# Patient Record
(demographics unavailable — no encounter records)

---

## 2024-11-16 NOTE — PHYSICAL EXAM
[Appropriately responsive] : appropriately responsive [Alert] : alert [No Acute Distress] : no acute distress [Soft] : soft [Non-tender] : non-tender [Non-distended] : non-distended [No HSM] : No HSM [No Lesions] : no lesions [No Mass] : no mass [Oriented x3] : oriented x3 [Examination Of The Breasts] : a normal appearance [No Discharge] : no discharge [Breast Mass Right Breast ___cm] : no was mass palpable [Labia Majora] : normal [Labia Minora] : normal [Normal] : normal [Tenderness] : nontender [Enlarged ___ wks] : not enlarged [Mass ___ cm] : no uterine mass was palpated [Uterine Adnexae] : non-palpable [FreeTextEntry5] : pap not done

## 2024-11-16 NOTE — HISTORY OF PRESENT ILLNESS
[No] : Patient does not have concerns regarding sex [Currently Active] : currently active [Men] : men [Vaginal] : vaginal [TextBox_4] : On ibandronate for osteoporosis, unsure who prescribes it. Can been on x 2 yrs. She has been on forteo in the past. Still on bioidentical hormones rx'd by naturopath. SHe feels well on them and would like to remain on them. She is aware of risks associated. No vb  [PapSmeardate] : 2023 [Mammogramdate] : 1/2023 [TextBox_31] : nml hpv- [BoneDensityDate] : 7/2021 [TextBox_37] : osteoporosis [ColonoscopyDate] : 9/2024 [TextBox_43] : fu 5 yrs

## 2024-12-09 NOTE — ASSESSMENT
[FreeTextEntry1] : DILEEP CARMONA is a 67 year old female here for a physical exam.  She is also here to follow up on medical issues as noted above.  Dileep has Hashimoto s thyroiditis and thyroid nodules, osteoporosis/osteopenia, chronic back/leg pain/spinal stenosis, OA, chronic tinnitus, and h/o Hep C (considered cured).   She still is due for ENT f/u (Dr. Mayo) re her large thyroid nodules (3.6 cm R, 4 cm L). Stressed the importance of f/u with ENT. Revd incr risk for malignant transformation in thyroid nodules 4+ cm in size. Due again for a repeat thyroid US and to avoid delay I will order this for her but stressed that she must f/u with Dr. Mayo's team in near future.   She also has elevated PTH level with bone loss and I again recommended she see endo for further eval to determine if has hyperparathyroidism. Check labs today as she has not yet seen Endo.   Due for thyroid US (f/u nodules) and RUQ/liver US (h/o Hep C infection) and she will sched in near future

## 2024-12-09 NOTE — HEALTH RISK ASSESSMENT
[No falls in past year] : Patient reported no falls in the past year [0] : 1) Little interest or pleasure doing things: Not at all (0) [PHQ-2 Negative - No further assessment needed] : PHQ-2 Negative - No further assessment needed [Never] : Never [[id number]] : 0

## 2024-12-09 NOTE — PLAN
[FreeTextEntry1] : Continue all medications as prescribed. Check labs as above. Will adjust any medications based upon lab results.  Reviewed age-appropriate preventive screening tests with patient. UTD on gyn exam, DEXA and colonoscopy screening. Due for mammogram (Rx in place from gyn team) and she will sched for near future.   Reviewed/recommended annual flu vaccine, PCV 20 and Shingrix series (and if had 2 doses of Shingrix needs to get dates for me).   She defers on ECG today. Has no cardiac sxs or concerns.  Discussed clean eating (eg Mediterranean style plant focused whole food eating plan) and regular exercise/staying as physically active as possible. Include balance exercises and strength training and core strengthening exercises for bone health and to decrease risk for falls.  Recommended calcium 5958-6333 mg daily ideally mainly or fully from food sources +/- supplement if needed, vit D 5472-1918 IU or whatever dose is needed to get D into 30-80 range. Recommended regular weight bearing exercise as well as strength training exercise 3 or more times per week and balance exercises regularly.  Reviewed Mediterranean style eating plan, regular physical exercise, stay well hydrated, do regular brain/cognitive exercise/challenges (eg puzzles of any type etc), avoid alcohol, mindfulness/meditation, vit D, eat omega three rich foods daily +/- use omega three supplement, keep engaged socially, stress reduction measures etc to help promote healthy brain aging.  Reviewed importance of good self care (e.g. meditation, yoga, adequate rest, regular exercise, magnesium, clean eating, etc.).  Follow up with specialists as recommended by them.   Follow up for next physical in one year. Schedule RPA visit (thyroid, PTH, pain med f/u etc) in about 6 months and will rpt labs then.

## 2024-12-09 NOTE — HISTORY OF PRESENT ILLNESS
[FreeTextEntry1] : DILEEP CARMONA is a 67 year old female here for a physical exam. [de-identified] : Her last physical exam was in 5/2023  Vaccines: Tetanus is up to date, Tdap 9/21/2022 Pneumococcal vaccination is NOT up to date Shingrix is NOT up to date, she states had 1 dose Shingrix at [facility] in past year or so she thinks and she will get second dose in near future and let me know dates  Her last dentist visit was within past 6-12 months Her last eye doctor appointment was less than one year ago Her last dermatologist visit was less than one year ago, Dr. Castrejon  GYN visit is up to date, 11/2024 Dr. Moore Mammogram is NOT up to date, 1/2023 Colon cancer screening is up to date; colonoscopy 9/2024 wnl, rpt due at 5 yrs (9/2029) due to h/o polyps, Dr. Rodriguez  DEXA is up to date, 12/2023, osteoporosis improved to osteopenia -2.3 fem neck lowest T score; on Ibandronate Rx   Her diet is healthy overall Exercise:   Crystal has Hashimoto s thyroiditis and thyroid nodules, osteoporosis/osteopenia, chronic back/leg pain/spinal stenosis, OA, and h/o Hep C (considered cured).    F/u thyroid nodules-- She usually sees specialist in [facility] annually and he titrates her natural thyroid regimen. She has multiple nodules at least one of which (3.5 cm R sided nodule) requires periodic thyroid biopsy. She saw Dr. Mayo and had thyroid biopsy repeated (due to incr size of nodule on imaging) 5/2022 and results were benign.  She sees [facility] specialist re natural compounded thyroid med and would like TFTs checked today with labs  Sees Dr. Goldberg (rheum, [facility]) for her osteoporosis. Started on Ibandronate in 2023 and is tolerating well   She takes Tramadol 25 mg 1/3 of a pill occas for severe pain. Limits use as much as she can and Rx for #60 pills lasts 4-6 mos. Never takes more than 1/3 to 1/2 pill and med is well tolerated.   Taking gabapentin 100 mg QHS though can take up to TID if needed. Main reason she is taking this is for her chronic pain and tinnitus and it does work. Is using Tramadol more days. Last RF 7/2024 for #60 and has 2 pills left. Would like RF  Consulted with a different ortho/spine HSS Dr. Calzada in 2023. Had MRI of C/T/L/S spine. She has multilevel OA and post surgical changes. He told her no more surgical options exist for her at this point and recommended medical mgmt.. She has not seen pain mgmt. and we are discussing this today. She will look into seeing a pain mgmt. 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 Had XR from 10/2022 showing migration of screw. Is managing to live with this for now but pain is daily and significant and worsening as she gets older . Saw specialist in [facility] a few yrs ago and was advised no surgical options.   A 12/2023 thyroid US showed enlgd right thyroid lobe and multiple B nodules incl two large nodules (3.6 cm right lobe, 4 cm left lobe). Results faxed to Dr. Mayo and recommended she sched f/u visit with him to review thyroid US and whether she needs to consider repeat thyroid biopsy at this time. She still did not get to see him. We revd today again and she says will schedule, and since is due for updated thyroid US she will do that prior to ENT visit   1/5/24 PTH elevated 81, vit D 36. Rec incr vit D by addtl 1000 IU daily. Recommended she see endo (she has Endo in [facility] who manages her thyroid, or can see endo locally) for eval for poss hyperparathyroidism and if has that should see surg for parathyroidectomy (given her osteopenia/porosis, h/o kidney stones etc); if can not get to endo for >6 mos needs RPA visit here in 3-4 to recheck labs. She did not yet see Endo nor did she RTO for rpt labs so will do these today.   Her thyroid compounded med is liothyronine 25 mcg with 80 mcg levothyroxine   Has OA B hands R >>L. R hand dominant. Uses heat, ice. We are discussing can see ortho/hand if/when feels need to and in the meantime she will use supp care measures for OA.   Brandan is 2 (her grandson)!

## 2024-12-09 NOTE — PLAN
[FreeTextEntry1] : Continue all medications as prescribed. Check labs as above. Will adjust any medications based upon lab results.  Reviewed age-appropriate preventive screening tests with patient. UTD on gyn exam, DEXA and colonoscopy screening. Due for mammogram (Rx in place from gyn team) and she will sched for near future.   Reviewed/recommended annual flu vaccine, PCV 20 and Shingrix series (and if had 2 doses of Shingrix needs to get dates for me).   She defers on ECG today. Has no cardiac sxs or concerns.  Discussed clean eating (eg Mediterranean style plant focused whole food eating plan) and regular exercise/staying as physically active as possible. Include balance exercises and strength training and core strengthening exercises for bone health and to decrease risk for falls.  Recommended calcium 6257-0669 mg daily ideally mainly or fully from food sources +/- supplement if needed, vit D 4786-3484 IU or whatever dose is needed to get D into 30-80 range. Recommended regular weight bearing exercise as well as strength training exercise 3 or more times per week and balance exercises regularly.  Reviewed Mediterranean style eating plan, regular physical exercise, stay well hydrated, do regular brain/cognitive exercise/challenges (eg puzzles of any type etc), avoid alcohol, mindfulness/meditation, vit D, eat omega three rich foods daily +/- use omega three supplement, keep engaged socially, stress reduction measures etc to help promote healthy brain aging.  Reviewed importance of good self care (e.g. meditation, yoga, adequate rest, regular exercise, magnesium, clean eating, etc.).  Follow up with specialists as recommended by them.   Follow up for next physical in one year. Schedule RPA visit (thyroid, PTH, pain med f/u etc) in about 6 months and will rpt labs then.

## 2024-12-09 NOTE — PHYSICAL EXAM
[No Acute Distress] : no acute distress [Well Nourished] : well nourished [Well Developed] : well developed [Well-Appearing] : well-appearing [Normal Sclera/Conjunctiva] : normal sclera/conjunctiva [EOMI] : extraocular movements intact [Normal Outer Ear/Nose] : the outer ears and nose were normal in appearance [Normal Oropharynx] : the oropharynx was normal [No JVD] : no jugular venous distention [No Lymphadenopathy] : no lymphadenopathy [Supple] : supple [Thyroid Normal, No Nodules] : the thyroid was normal and there were no nodules present [No Respiratory Distress] : no respiratory distress  [No Accessory Muscle Use] : no accessory muscle use [Clear to Auscultation] : lungs were clear to auscultation bilaterally [Normal Rate] : normal rate  [Regular Rhythm] : with a regular rhythm [Normal S1, S2] : normal S1 and S2 [No Murmur] : no murmur heard [No Carotid Bruits] : no carotid bruits [No Varicosities] : no varicosities [Pedal Pulses Present] : the pedal pulses are present [No Edema] : there was no peripheral edema [No Extremity Clubbing/Cyanosis] : no extremity clubbing/cyanosis [Soft] : abdomen soft [Non Tender] : non-tender [Non-distended] : non-distended [No Masses] : no abdominal mass palpated [No HSM] : no HSM [Normal Bowel Sounds] : normal bowel sounds [Normal Posterior Cervical Nodes] : no posterior cervical lymphadenopathy [Normal Anterior Cervical Nodes] : no anterior cervical lymphadenopathy [No Joint Swelling] : no joint swelling [Grossly Normal Strength/Tone] : grossly normal strength/tone [No Rash] : no rash [Coordination Grossly Intact] : coordination grossly intact [No Focal Deficits] : no focal deficits [Normal Gait] : normal gait [Normal Affect] : the affect was normal [Normal Insight/Judgement] : insight and judgment were intact [de-identified] : slender frame, able to get on and off exam table and squat and stand and change positions independently  [de-identified] : +diffuse thyroid gland enlargement R>L, R sided nodule 3-4+ cm in size [de-identified] : mild decr ROM flexion R wrist with mild bony prominence dorsal wrist/dorsal proximal hand NT

## 2024-12-09 NOTE — HISTORY OF PRESENT ILLNESS
[FreeTextEntry1] : DILEEP CARMONA is a 67 year old female here for a physical exam. [de-identified] : Her last physical exam was in 5/2023  Vaccines: Tetanus is up to date, Tdap 9/21/2022 Pneumococcal vaccination is NOT up to date Shingrix is NOT up to date, she states had 1 dose Shingrix at [facility] in past year or so she thinks and she will get second dose in near future and let me know dates  Her last dentist visit was within past 6-12 months Her last eye doctor appointment was less than one year ago Her last dermatologist visit was less than one year ago, Dr. Castrejon  GYN visit is up to date, 11/2024 Dr. Moore Mammogram is NOT up to date, 1/2023 Colon cancer screening is up to date; colonoscopy 9/2024 wnl, rpt due at 5 yrs (9/2029) due to h/o polyps, Dr. Rodriguez  DEXA is up to date, 12/2023, osteoporosis improved to osteopenia -2.3 fem neck lowest T score; on Ibandronate Rx   Her diet is healthy overall Exercise:   Crystal has Hashimoto s thyroiditis and thyroid nodules, osteoporosis/osteopenia, chronic back/leg pain/spinal stenosis, OA, and h/o Hep C (considered cured).    F/u thyroid nodules-- She usually sees specialist in [facility] annually and he titrates her natural thyroid regimen. She has multiple nodules at least one of which (3.5 cm R sided nodule) requires periodic thyroid biopsy. She saw Dr. Mayo and had thyroid biopsy repeated (due to incr size of nodule on imaging) 5/2022 and results were benign.  She sees [facility] specialist re natural compounded thyroid med and would like TFTs checked today with labs  Sees Dr. Goldberg (rheum, [facility]) for her osteoporosis. Started on Ibandronate in 2023 and is tolerating well   She takes Tramadol 25 mg 1/3 of a pill occas for severe pain. Limits use as much as she can and Rx for #60 pills lasts 4-6 mos. Never takes more than 1/3 to 1/2 pill and med is well tolerated.   Taking gabapentin 100 mg QHS though can take up to TID if needed. Main reason she is taking this is for her chronic pain and tinnitus and it does work. Is using Tramadol more days. Last RF 7/2024 for #60 and has 2 pills left. Would like RF  Consulted with a different ortho/spine HSS Dr. Calzada in 2023. Had MRI of C/T/L/S spine. She has multilevel OA and post surgical changes. He told her no more surgical options exist for her at this point and recommended medical mgmt.. She has not seen pain mgmt. and we are discussing this today. She will look into seeing a pain mgmt. 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 Had XR from 10/2022 showing migration of screw. Is managing to live with this for now but pain is daily and significant and worsening as she gets older . Saw specialist in [facility] a few yrs ago and was advised no surgical options.   A 12/2023 thyroid US showed enlgd right thyroid lobe and multiple B nodules incl two large nodules (3.6 cm right lobe, 4 cm left lobe). Results faxed to Dr. Mayo and recommended she sched f/u visit with him to review thyroid US and whether she needs to consider repeat thyroid biopsy at this time. She still did not get to see him. We revd today again and she says will schedule, and since is due for updated thyroid US she will do that prior to ENT visit   1/5/24 PTH elevated 81, vit D 36. Rec incr vit D by addtl 1000 IU daily. Recommended she see endo (she has Endo in [facility] who manages her thyroid, or can see endo locally) for eval for poss hyperparathyroidism and if has that should see surg for parathyroidectomy (given her osteopenia/porosis, h/o kidney stones etc); if can not get to endo for >6 mos needs RPA visit here in 3-4 to recheck labs. She did not yet see Endo nor did she RTO for rpt labs so will do these today.   Her thyroid compounded med is liothyronine 25 mcg with 80 mcg levothyroxine   Has OA B hands R >>L. R hand dominant. Uses heat, ice. We are discussing can see ortho/hand if/when feels need to and in the meantime she will use supp care measures for OA.   Brandan is 2 (her grandson)!

## 2024-12-09 NOTE — HEALTH RISK ASSESSMENT
[No falls in past year] : Patient reported no falls in the past year [0] : 1) Little interest or pleasure doing things: Not at all (0) [PHQ-2 Negative - No further assessment needed] : PHQ-2 Negative - No further assessment needed [Never] : Never [[id number]] : 0

## 2024-12-09 NOTE — REVIEW OF SYSTEMS
[Hearing Loss] : hearing loss [Joint Pain] : joint pain [Joint Stiffness] : joint stiffness [Muscle Pain] : muscle pain [Back Pain] : back pain [Negative] : Heme/Lymph [Sore Throat] : no sore throat [Postnasal Drip] : no postnasal drip [FreeTextEntry4] : +tinnitus, persistent and now assoc with some hearing loss so will be getting hearing aid  [FreeTextEntry9] : see HPI, Fx Radius R in early 2023 healed with some mild dorsal deformity (bony growth) but overall has healed well  [de-identified] : +brain fog more as she gets older  [FreeTextEntry1] : Thyroid nodules stable in past year but overall enlarging over time she notes, No dysphagia or vocal changes related to it

## 2024-12-09 NOTE — PHYSICAL EXAM
[No Acute Distress] : no acute distress [Well Nourished] : well nourished [Well Developed] : well developed [Well-Appearing] : well-appearing [Normal Sclera/Conjunctiva] : normal sclera/conjunctiva [EOMI] : extraocular movements intact [Normal Outer Ear/Nose] : the outer ears and nose were normal in appearance [Normal Oropharynx] : the oropharynx was normal [No JVD] : no jugular venous distention [No Lymphadenopathy] : no lymphadenopathy [Supple] : supple [Thyroid Normal, No Nodules] : the thyroid was normal and there were no nodules present [No Respiratory Distress] : no respiratory distress  [No Accessory Muscle Use] : no accessory muscle use [Clear to Auscultation] : lungs were clear to auscultation bilaterally [Normal Rate] : normal rate  [Regular Rhythm] : with a regular rhythm [Normal S1, S2] : normal S1 and S2 [No Murmur] : no murmur heard [No Carotid Bruits] : no carotid bruits [No Varicosities] : no varicosities [Pedal Pulses Present] : the pedal pulses are present [No Edema] : there was no peripheral edema [No Extremity Clubbing/Cyanosis] : no extremity clubbing/cyanosis [Soft] : abdomen soft [Non Tender] : non-tender [Non-distended] : non-distended [No Masses] : no abdominal mass palpated [No HSM] : no HSM [Normal Bowel Sounds] : normal bowel sounds [Normal Posterior Cervical Nodes] : no posterior cervical lymphadenopathy [Normal Anterior Cervical Nodes] : no anterior cervical lymphadenopathy [No Joint Swelling] : no joint swelling [Grossly Normal Strength/Tone] : grossly normal strength/tone [No Rash] : no rash [Coordination Grossly Intact] : coordination grossly intact [No Focal Deficits] : no focal deficits [Normal Gait] : normal gait [Normal Affect] : the affect was normal [Normal Insight/Judgement] : insight and judgment were intact [de-identified] : slender frame, able to get on and off exam table and squat and stand and change positions independently  [de-identified] : +diffuse thyroid gland enlargement R>L, R sided nodule 3-4+ cm in size [de-identified] : mild decr ROM flexion R wrist with mild bony prominence dorsal wrist/dorsal proximal hand NT

## 2024-12-09 NOTE — REVIEW OF SYSTEMS
[Hearing Loss] : hearing loss [Joint Pain] : joint pain [Joint Stiffness] : joint stiffness [Muscle Pain] : muscle pain [Back Pain] : back pain [Negative] : Heme/Lymph [Sore Throat] : no sore throat [Postnasal Drip] : no postnasal drip [FreeTextEntry4] : +tinnitus, persistent and now assoc with some hearing loss so will be getting hearing aid  [FreeTextEntry9] : see HPI, Fx Radius R in early 2023 healed with some mild dorsal deformity (bony growth) but overall has healed well  [de-identified] : +brain fog more as she gets older  [FreeTextEntry1] : Thyroid nodules stable in past year but overall enlarging over time she notes, No dysphagia or vocal changes related to it

## 2025-04-16 NOTE — REVIEW OF SYSTEMS
[Hearing Loss] : hearing loss [Joint Pain] : joint pain [Joint Stiffness] : joint stiffness [Muscle Pain] : muscle pain [Back Pain] : back pain [Negative] : Heme/Lymph [Sore Throat] : no sore throat [Postnasal Drip] : no postnasal drip [FreeTextEntry4] : +tinnitus, persistent and now assoc with some hearing loss; has hearing aids though has not improved her tinnitus. Tinnitus worsening over past few yrs   [FreeTextEntry9] : OA related aches and pains and stiffness, she manages  [de-identified] : +brain fog more as she gets older  [FreeTextEntry1] : Thyroid nodules stable in past year but overall enlarging over time she notes, No dysphagia or vocal changes related to it

## 2025-04-16 NOTE — PHYSICAL EXAM
[No Acute Distress] : no acute distress [Well Nourished] : well nourished [Well Developed] : well developed [Well-Appearing] : well-appearing [Normal Sclera/Conjunctiva] : normal sclera/conjunctiva [EOMI] : extraocular movements intact [Normal Outer Ear/Nose] : the outer ears and nose were normal in appearance [Normal Oropharynx] : the oropharynx was normal [No JVD] : no jugular venous distention [No Lymphadenopathy] : no lymphadenopathy [Supple] : supple [Thyroid Normal, No Nodules] : the thyroid was normal and there were no nodules present [No Respiratory Distress] : no respiratory distress  [No Accessory Muscle Use] : no accessory muscle use [Clear to Auscultation] : lungs were clear to auscultation bilaterally [Normal Rate] : normal rate  [Regular Rhythm] : with a regular rhythm [Normal S1, S2] : normal S1 and S2 [No Murmur] : no murmur heard [No Carotid Bruits] : no carotid bruits [No Varicosities] : no varicosities [Pedal Pulses Present] : the pedal pulses are present [No Edema] : there was no peripheral edema [No Extremity Clubbing/Cyanosis] : no extremity clubbing/cyanosis [Soft] : abdomen soft [Non Tender] : non-tender [Non-distended] : non-distended [No Masses] : no abdominal mass palpated [No HSM] : no HSM [Normal Bowel Sounds] : normal bowel sounds [Normal Posterior Cervical Nodes] : no posterior cervical lymphadenopathy [Normal Anterior Cervical Nodes] : no anterior cervical lymphadenopathy [No Joint Swelling] : no joint swelling [Grossly Normal Strength/Tone] : grossly normal strength/tone [No Rash] : no rash [Coordination Grossly Intact] : coordination grossly intact [No Focal Deficits] : no focal deficits [Normal Gait] : normal gait [Normal Affect] : the affect was normal [Normal Insight/Judgement] : insight and judgment were intact [de-identified] : slender frame [de-identified] : +diffuse thyroid gland enlargement R>L, R sided nodule 3-4+ cm in size, stable exam from prior

## 2025-04-16 NOTE — PLAN
[FreeTextEntry1] : Continue all medications as prescribed. Check labs as above. Will adjust any medications based upon lab results.  Reminded her she is due for mammo/US (orders in from Dr. Moore) and she says will schedule soon along with thyroid US and liver US  Reviewed/recommended annual flu vaccine, PCV 20 and Shingrix series (and if had 2 doses of Shingrix needs to get dates for me).   Discussed clean eating (eg Mediterranean style plant focused whole food eating plan) and regular exercise/staying as physically active as possible. Include balance exercises and strength training and core strengthening exercises for bone health and to decrease risk for falls.  Recommended calcium 7952-2325 mg daily ideally mainly or fully from food sources +/- supplement if needed, vit D 4088-3634 IU or whatever dose is needed to get D into 30-80 range. Recommended regular weight bearing exercise as well as strength training exercise 3 or more times per week and balance exercises regularly.  Reviewed Mediterranean style eating plan, regular physical exercise, stay well hydrated, do regular brain/cognitive exercise/challenges (eg puzzles of any type etc), avoid alcohol, mindfulness/meditation, vit D, eat omega three rich foods daily +/- use omega three supplement, keep engaged socially, stress reduction measures etc to help promote healthy brain aging.  Reviewed importance of good self care (e.g. meditation, yoga, adequate rest, regular exercise, magnesium, clean eating, etc.).  Follow up with specialists as recommended by them.   Follow up for CPE and RPA visit (thyroid, PTH, pain med f/u etc) in about 6 months and will rpt labs then.  Time spent immediately before and after, as well as Face-to-face time spent during visit with patient, over half in discussion of the above diagnoses and treatment plan: 30 minutes.

## 2025-04-16 NOTE — ASSESSMENT
[FreeTextEntry1] : DILEEP CARMONA is a 67 year old female here for follow up on medical issues as noted above.  Dileep has Hashimoto s thyroiditis and thyroid nodules, osteoporosis/osteopenia, chronic back/leg pain/spinal stenosis, OA, chronic tinnitus, and h/o Hep C (considered cured).   She had ENT f/u (Dr. Mayo) re her large thyroid nodules (3.6 cm R, 4 cm L) 4/2025 and notes he advised unlikely that she needs biopsy though he recommended she get f/u thyroid US. She did not get Rx from him so I placed order for thyroid US, cc Dr. Mayo  At visit 12/2024 labs showed TSH again low and FT3 high, FT4 wnl. Urged her to d/w her naturopath in [facility] to get the liothyronine component of her compounded thyroid med d/c'ed or decreased and rpt TFTs in 4-6 wks. She did not yet do this (see below). She notes that she feels thyroid med over-replaced in terms of jitteriness/edgy/hyper feeling. I also wonder if thyroid med over-replacement might be exacerbating her tinnitus   She notes that thyroid med naturopath specialist has moved out [place] and is too difficult and costly to cont to see him. Wants me to take over thyroid meds and we revd I am am happy though cannot do compounded meds. She is willing to transition to regular levothyroxine (+/- liothyronine though not clear that she needs that and we revd this T3 med is much less freq used now than in the past, and as her labs shows T3 levels too high I am not convinces that she needs this. Will check TFts today and if similar to prior we disc plan to stop liothyronine and cont levothyroxine )curr 80 mcg in her compounded med) at similar dose (75 mcg for now as nearest dose and revd better to under replace than overreplace in short term to minimize risk for arrhythmia etc ). Will track TFts and adjust her meds.   AT her 12/2024 visit RUQ/liver US (f/u Hep C) was ordered though not yet completed. Ordered again and she will schedule    Will be seeing pain mgmt at [facility] re her chronic back pain.

## 2025-04-16 NOTE — HISTORY OF PRESENT ILLNESS
[FreeTextEntry1] : DILEEP CARMONA is a 67 year old female here for a follow up visit. [de-identified] : Crystal has Hashimoto s thyroiditis and thyroid nodules, osteoporosis/osteopenia, chronic back/leg pain/spinal stenosis, OA, and h/o Hep C (considered cured).   F/u thyroid nodules-- She usually sees specialist in [facility] annually and he titrates her natural thyroid regimen. She has multiple nodules at least one of which (3.5 cm R sided nodule) requires periodic thyroid biopsy. She saw Dr. Mayo and had thyroid biopsy repeated (due to incr size of nodule on imaging) 5/2022 and results were benign.  She sees [facility] specialist re natural compounded thyroid med and would like TFTs checked today with labs  Sees Dr. Goldberg ([facility]) for her osteoporosis. Started on Ibandronate in 2023 and is tolerating well   She takes Tramadol 25 mg 1/3 of a pill occas for severe pain. Limits use as much as she can and Rx for #60 pills lasts 4-6 mos. Never takes more than 1/3 to 1/2 pill and med is well tolerated.   Taking gabapentin 100 mg QHS though can take up to TID if needed. Main reason she is taking this is for her chronic pain and tinnitus and it does work. Is using Tramadol more days. Last RF 7/2024 for #60 and has 2 pills left. Would like RF  Consulted with a different ortho/spine [facility] Dr. Calzada in 2023. Had MRI of C/T/L/S spine. She has multilevel OA and post surgical changes. He told her no more surgical options exist for her at this point and recommended medical mgmt.. She has not seen pain mgmt. and we are discussing this today. She will look into seeing a pain mgmt. 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 Had XR from 10/2022 showing migration of screw. Is managing to live with this for now but pain is daily and significant and worsening as she gets older . Saw specialist in [facility] a few yrs ago and was advised no surgical options.   A 12/2023 thyroid US showed enlgd right thyroid lobe and multiple B nodules incl two large nodules (3.6 cm right lobe, 4 cm left lobe). Results faxed to Dr. Mayo and recommended she sched f/u visit with him to review thyroid US and whether she needs to consider repeat thyroid biopsy at this time. She did not get to see him in 2024. We revd at her 12/2024 visit again re need for updated thyroid US and ENT follow up visit .   Her thyroid compounded med is liothyronine 25 mcg with 80 mcg levothyroxine. Labs past few checks have shown med over-replacement and 12/2024 labs showed low TSH, high FT3, nl FT4. I recommended she d/w her naturopath in [facility] re decreasing her compounded med. She notes that she had been seeing him/consulting with him Q3mo though he has moved from [facility] to [place] and so getting harder to see him and also very costly. She would like me to take over RF thyroid meds.   Saw Dr. Mayo this week. Had not done thyroid US prior to that visit as did not realize order was in. He told her he no longer does thyroid biopsies though he thinks less likely that she needs a biopsy at this time. He recommended she have thyroid US.  Has OA B hands R >>L. R hand dominant. Uses heat, ice. We are discussing can see ortho/hand if/when feels need to and in the meantime she will use supp care measures for OA.   Tinnitus is driving her crazy as is loud and constant and worsening over the years

## 2025-04-16 NOTE — HEALTH RISK ASSESSMENT
[No falls in past year] : Patient reported no falls in the past year [0] : 2) Feeling down, depressed, or hopeless: Not at all (0) [PHQ-2 Negative - No further assessment needed] : PHQ-2 Negative - No further assessment needed [Never] : Never [[id number]] : 0

## 2025-06-13 NOTE — HISTORY OF PRESENT ILLNESS
[FreeTextEntry1] : DILEEP CARMONA is a 68 year old female here for a follow up visit.  [de-identified] : Crystal has Hashimoto s thyroiditis and thyroid nodules, osteoporosis/osteopenia (On Ibandronate Rx since 2023 with rheum), chronic back/leg pain/spinal stenosis, OA, and h/o Hep C (considered cured).  Sees Dr. Goldberg ([facility]) for her osteoporosis. Has seen Dr. Mayo ([facility]) periodically re thyroid nodule over prior years though needs to change ENT at this time as was advised by Dr. Mayo/team that he is no longer doing thyroid nodule/biopsy follow up.   She takes Tramadol 25 mg 1/3 of a pill occas for severe pain. Limits use as much as she can and Rx for #60 pills lasts 4-6 mos. Never takes more than 1/3 to 1/2 pill and med is well tolerated. Requesting RF today, last RF was in early 12/22024   Taking gabapentin 100 mg QHS though can take up to TID if needed. Main reason she is taking this is for her chronic pain and tinnitus and it does work. Is using Tramadol more days.  Consulted with a different ortho/spine [facility] Dr. Calzada in 2023. Had MRI of C/T/L/S spine. She has multilevel OA and post surgical changes. He told her no more surgical options exist for her at this point and recommended medical mgmt.. She has not seen pain mgmt. and we are discussing this today. She will look into seeing a pain mgmt. specialist at [facility]  Screw in cervical spine has moved and is causing pain. Had XR from 10/2022 showing migration of screw. Is managing to live with this for now but pain is daily and significant and worsening as she gets older. Saw specialist in [facility] a few yrs ago and was advised no surgical options. We are reviewing again today option to see pain mgmt for consultation and she feels is not needed at this time as pain is at a stable/manageable level with current regimen.

## 2025-06-13 NOTE — HEALTH RISK ASSESSMENT
[No falls in past year] : Patient reported no falls in the past year [0] : 2) Feeling down, depressed, or hopeless: Not at all (0) [PHQ-2 Negative - No further assessment needed] : PHQ-2 Negative - No further assessment needed [Never] : Never [[id number]] : 0

## 2025-06-13 NOTE — ASSESSMENT
[FreeTextEntry1] : DILEEP CARMONA is a 68 year old female here for follow up on medical issues as noted above.  Dileep has Hashimoto s thyroiditis and thyroid nodules, osteoporosis/osteopenia (On Ibandronate Rx since 2023 with rheum), chronic back/leg pain/spinal stenosis, OA, and h/o Hep C (considered cured).  Requests RF Tramadol, Uses sparingly. Last RF 12//9/24 #60. Using approx 10 pills per month on average.   Her thyroid compounded med was liothyronine 25 mcg with 80 mcg levothyroxine (though clarifies today for me that this was BID dosed and not just once a day). Labs past few checks have shown med over-replacement and 12/2024 labs showed low TSH, high FT3, nl FT4. I [facility] her compounded med. She requested in Spring 2024 that we take over RF thyroid meds as [facility] too [facility]/time consuming.  At 4/17/25 visit we Stopped compounded thyroid med (25 mcg liothyronine, 80 mcg levothyroxine BID though at the time I thought was once daily dosing) and starting levothyroxine 75 mcg daily as labs d/w thyroid med over-replacement and she was having s/sx thyroid med over-replacement (jittery, incr anxiety, incr tinnitus etc). Requested she rpt labs at [facility] in 4-6 wks for further input re med adjustment.  In early May 2025 we Added liothyronine 5 mcg 2 pills (10 mcg) daily at her request (as she was feeling sluggish/wgt gain since stopping the 25 mcg liothyronine compounded med). Requested she repeat labs in 4-6 wks; will do these today.   She updated us this week that instead of adding liothyronine 10 mcg (did not realize that Rx was sent to [facility]) she restarted 25 mcg liothyronine/Levothyroxine 80 mcg daily along with the 75 mcg of levothyroxine as was feeling completely exhausted taking just the 75 mcg levothyroxine daily. Clarifies for me today that she had been taking the compounded med (80/25mcg) TWICE a day and not just once a day so daily levo dose was closer to 160 mcg.   CURRENT THYROID  MED REGIMEN is Levothyroxine 75 mcg daily plus levothyroxine 80/liothyronine 25 mcg daily. Will check labs today and adjust meds further. Again revd that liothyronine is not recommended for regular use in general , and I recommend she work with us to find the dose of levothyroxine alone that optimizes her thyroid levels without need for the liothyronine.  Revd option to try Levothyroxine 150 mcg daily WITHOUT Liothyronine; she prefers to cont Liothyronine dose for now though agrees can be lower dose.   NEW THYROID PLAN: Check labs today (mainly to have a new baseline to work with for next few sets of labs). START Levothyroxine 150 mcg daily plus Liothyronine 5 mcg daily . Revd need to do f/u labs ([facility] orders in placed) in 4-6 weeks so we can reassess and adjust dose of med if needed. Revd should not adjust meds/doses on her own without first reviewing with us. Also revd/offered Endo referral for assistance with med mgmt; she defers on Endo referral for now though will let me know if ever wishes to pursue.    Offered endo referral for consultation/assistance with med mgmt.; she defers at this time though can let me know if changes her mind   4/27/25 thyroid US shows new 5 mm nodule TiRads 7 requiring f/u imaging and stable 3.6 cm right lower thyroid nodule, stable from 2023, previously biopsied and benign path. Recommended she follow up with Dr. Mayo if any need for repeat biopsy at this time vs not and will d/w him re what he recommends in terms of f/u thyroid US (6 mos vs 12 mos etc).   She notes that she d/w Dr. Mayo and was advised he is not managing thyroid nodules/doing thyroid biopsies anymore so she will need to see a different specialist and should get her prior records from Dr. Mayo's office to bring to new appt. Given Dr. Bao Christine and Dr. Asmita Brtit's information.   5/2025 RUQ/liver US (f/u Hep C) showed normal liver without lesion. Incidentally noted 5 mm right renal lower pole kidney stone without obstruction or hydronephrosis was noted. Revd excellent hydration and limit/avoid calcium in pill form, limit/avoid oxalic acid rich foods, and monitor for sxs and f/u if any occur. Can consider urol consult if desired vs defer unless any sxs occur  Chronic pain. We are reviewing again today option to see pain mgmt for consultation and she feels is not needed at this time as pain is at a stable/manageable level with current regimen. She agrees that if her pain gets signif worse/more life interfering or if need for pain meds incr signif over time she would be willing to see pain mgmt.

## 2025-06-13 NOTE — PLAN
[FreeTextEntry1] : Continue all medications as prescribed EXCEPT START Levothyroxine 150 mcg daily plus Liothyronine 5 mcg daily. Check labs as above. Will adjust any medications based upon lab results.  Reviewed/recommended annual flu vaccine, pneumococcal vaccine (PCV 20 OR PCV 21), and Shingrix series (and if had 2 doses of Shingrix needs to get dates for me).  Discussed clean eating (eg Mediterranean style plant focused whole food eating plan) and regular exercise/staying as physically active as possible. Include balance exercises and strength training and core strengthening exercises for bone health and to decrease risk for falls.  Should try to get calcium needs met fully from food forms and limit/avoid supplement in light of renal stone noted on US 5/2025 Recommended calcium [id number] mg daily ideally mainly or fully from food sources +/- supplement if needed, vit D 4608-0965 IU daily with food that contains fat for better vitamin absorption. Recommended regular weight bearing exercise as well as strength training exercise 2-3+ times per week and balance exercises regularly.  Reviewed Mediterranean style eating plan, regular physical exercise, stay well hydrated, do regular brain/cognitive exercise/challenges (eg puzzles of any type etc), avoid alcohol, mindfulness/meditation, vit D, eat omega three rich foods daily +/- use omega three supplement, keep engaged socially, stress reduction measures etc to help promote healthy brain aging.  Reviewed kidney stones and prevention of same including excellent daily hydration, add lemon to water, limit/avoid oxalate rich foods, avoid calcium supplements (but ok to eat calcium rich foods).  Reviewed importance of good self care (e.g. meditation, yoga, adequate rest, regular exercise, magnesium, clean eating, etc.).  Follow up with specialists as recommended by them.  Follow up for CPE and RPA visit (thyroid, PTH, pain med f/u etc) in about 6 months and will rpt fasting labs then.  Time spent immediately before and after, as well as Face-to-face time spent during visit with patient, over half in discussion of the above diagnoses and treatment plan: 30 minutes.

## 2025-06-13 NOTE — HEALTH RISK ASSESSMENT
[No falls in past year] : Patient reported no falls in the past year [0] : 2) Feeling down, depressed, or hopeless: Not at all (0) [PHQ-2 Negative - No further assessment needed] : PHQ-2 Negative - No further assessment needed [Never] : Never [[id number]] : 0

## 2025-06-13 NOTE — REVIEW OF SYSTEMS
[Hearing Loss] : hearing loss [Joint Pain] : joint pain [Joint Stiffness] : joint stiffness [Muscle Pain] : muscle pain [Back Pain] : back pain [Negative] : Heme/Lymph [Sore Throat] : no sore throat [Postnasal Drip] : no postnasal drip [FreeTextEntry4] : +tinnitus, persistent and now assoc with some hearing loss; has hearing aids though has not improved her tinnitus. Tinnitus worsening over past few yrs   [FreeTextEntry9] : OA related aches and pains and stiffness, she manages  [de-identified] : +brain fog more as she gets older  [FreeTextEntry1] : Thyroid nodules stable in past year but overall enlarging over time she notes, No dysphagia or vocal changes related to it

## 2025-06-13 NOTE — PHYSICAL EXAM
[No Acute Distress] : no acute distress [Well Nourished] : well nourished [Well Developed] : well developed [Well-Appearing] : well-appearing [Normal Sclera/Conjunctiva] : normal sclera/conjunctiva [EOMI] : extraocular movements intact [Normal Outer Ear/Nose] : the outer ears and nose were normal in appearance [Normal Oropharynx] : the oropharynx was normal [No JVD] : no jugular venous distention [No Lymphadenopathy] : no lymphadenopathy [Supple] : supple [Thyroid Normal, No Nodules] : the thyroid was normal and there were no nodules present [No Respiratory Distress] : no respiratory distress  [No Accessory Muscle Use] : no accessory muscle use [Clear to Auscultation] : lungs were clear to auscultation bilaterally [Normal Rate] : normal rate  [Regular Rhythm] : with a regular rhythm [Normal S1, S2] : normal S1 and S2 [No Murmur] : no murmur heard [No Carotid Bruits] : no carotid bruits [No Varicosities] : no varicosities [Pedal Pulses Present] : the pedal pulses are present [No Edema] : there was no peripheral edema [No Extremity Clubbing/Cyanosis] : no extremity clubbing/cyanosis [Soft] : abdomen soft [Non Tender] : non-tender [Non-distended] : non-distended [No Masses] : no abdominal mass palpated [No HSM] : no HSM [Normal Bowel Sounds] : normal bowel sounds [Normal Posterior Cervical Nodes] : no posterior cervical lymphadenopathy [Normal Anterior Cervical Nodes] : no anterior cervical lymphadenopathy [No Joint Swelling] : no joint swelling [Grossly Normal Strength/Tone] : grossly normal strength/tone [No Rash] : no rash [Coordination Grossly Intact] : coordination grossly intact [No Focal Deficits] : no focal deficits [Normal Gait] : normal gait [Normal Affect] : the affect was normal [Normal Insight/Judgement] : insight and judgment were intact [de-identified] : slender frame [de-identified] : +diffuse thyroid gland enlargement R>L, R sided nodule 3-4+ cm in size, stable exam from prior

## 2025-06-13 NOTE — REVIEW OF SYSTEMS
[Hearing Loss] : hearing loss [Joint Pain] : joint pain [Joint Stiffness] : joint stiffness [Muscle Pain] : muscle pain [Back Pain] : back pain [Negative] : Heme/Lymph [Sore Throat] : no sore throat [Postnasal Drip] : no postnasal drip [FreeTextEntry4] : +tinnitus, persistent and now assoc with some hearing loss; has hearing aids though has not improved her tinnitus. Tinnitus worsening over past few yrs   [FreeTextEntry9] : OA related aches and pains and stiffness, she manages  [de-identified] : +brain fog more as she gets older  [FreeTextEntry1] : Thyroid nodules stable in past year but overall enlarging over time she notes, No dysphagia or vocal changes related to it

## 2025-06-13 NOTE — ASSESSMENT
[FreeTextEntry1] : DILEEP CARMONA is a 68 year old female here for follow up on medical issues as noted above.  Dileep has Hashimoto s thyroiditis and thyroid nodules, osteoporosis/osteopenia (On Ibandronate Rx since 2023 with rheum), chronic back/leg pain/spinal stenosis, OA, and h/o Hep C (considered cured).  Requests RF Tramadol, Uses sparingly. Last RF 12//9/24 #60. Using approx 10 pills per month on average.   Her thyroid compounded med was liothyronine 25 mcg with 80 mcg levothyroxine (though clarifies today for me that this was BID dosed and not just once a day). Labs past few checks have shown med over-replacement and 12/2024 labs showed low TSH, high FT3, nl FT4. I recommended she d/w her naturopath in [facility] re decreasing her compounded med. She requested in Spring 2024 that we take over RF thyroid meds as seeing naturopath in [facility] too costly/time consuming.  At 4/17/25 visit we Stopped compounded thyroid med (25 mcg liothyronine, 80 mcg levothyroxine BID though at the time I thought was once daily dosing) and starting levothyroxine 75 mcg daily as labs d/w thyroid med over-replacement and she was having s/sx thyroid med over-replacement (jittery, incr anxiety, incr tinnitus etc). Requested she rpt labs at [facility] in 4-6 wks for further input re med adjustment.  In early May 2025 we Added liothyronine 5 mcg 2 pills (10 mcg) daily at her request (as she was feeling sluggish/wgt gain since stopping the 25 mcg liothyronine compounded med). Requested she repeat labs in 4-6 wks; will do these today.   She updated us this week that instead of adding liothyronine 10 mcg (did not realize that Rx was sent to [facility]) she restarted 25 mcg liothyronine/Levothyroxine 80 mcg daily along with the 75 mcg of levothyroxine as was feeling completely exhausted taking just the 75 mcg levothyroxine daily. Clarifies for me today that she had been taking the compounded med (80/25mcg) TWICE a day and not just once a day so daily levo dose was closer to 160 mcg.   CURRENT THYROID  MED REGIMEN is Levothyroxine 75 mcg daily plus levothyroxine 80/liothyronine 25 mcg daily. Will check labs today and adjust meds further. Again revd that liothyronine is not recommended for regular use in general , and I recommend she work with us to find the dose of levothyroxine alone that optimizes her thyroid levels without need for the liothyronine.  Revd option to try Levothyroxine 150 mcg daily WITHOUT Liothyronine; she prefers to cont Liothyronine dose for now though agrees can be lower dose.   NEW THYROID PLAN: Check labs today (mainly to have a new baseline to work with for next few sets of labs). START Levothyroxine 150 mcg daily plus Liothyronine 5 mcg daily . Revd need to do f/u labs ([facility] orders in placed) in 4-6 weeks so we can reassess and adjust dose of med if needed. Revd should not adjust meds/doses on her own without first reviewing with us. Also revd/offered Endo referral for assistance with med mgmt; she defers on Endo referral for now though will let me know if ever wishes to pursue.    Offered endo referral for consultation/assistance with med mgmt.; she defers at this time though can let me know if changes her mind   4/27/25 thyroid US shows new 5 mm nodule TiRads 7 requiring f/u imaging and stable 3.6 cm right lower thyroid nodule, stable from 2023, previously biopsied and benign path. Recommended she follow up with Dr. Mayo if any need for repeat biopsy at this time vs not and will d/w him re what he recommends in terms of f/u thyroid US (6 mos vs 12 mos etc).   She notes that she d/w Dr. Mayo and was advised he is not managing thyroid nodules/doing thyroid biopsies anymore so she will need to see a different specialist and should get her prior records from Dr. Mayo's office to bring to new appt. Given Dr. Bao Christine and Dr. Asmita Britt's information.   5/2025 RUQ/liver US (f/u Hep C) showed normal liver without lesion. Incidentally noted 5 mm right renal lower pole kidney stone without obstruction or hydronephrosis was noted. Revd excellent hydration and limit/avoid calcium in pill form, limit/avoid oxalic acid rich foods, and monitor for sxs and f/u if any occur. Can consider urol consult if desired vs defer unless any sxs occur  Chronic pain. We are reviewing again today option to see pain mgmt for consultation and she feels is not needed at this time as pain is at a stable/manageable level with current regimen. She agrees that if her pain gets signif worse/more life interfering or if need for pain meds incr signif over time she would be willing to see pain mgmt.

## 2025-06-13 NOTE — PLAN
[FreeTextEntry1] : Continue all medications as prescribed EXCEPT START Levothyroxine 150 mcg daily plus Liothyronine 5 mcg daily. Check labs as above. Will adjust any medications based upon lab results.  Reviewed/recommended annual flu vaccine, pneumococcal vaccine (PCV 20 OR PCV 21), and Shingrix series (and if had 2 doses of Shingrix needs to get dates for me).  Discussed clean eating (eg Mediterranean style plant focused whole food eating plan) and regular exercise/staying as physically active as possible. Include balance exercises and strength training and core strengthening exercises for bone health and to decrease risk for falls.  Should try to get calcium needs met fully from food forms and limit/avoid supplement in light of renal stone noted on US 5/2025 Recommended calcium [id number] mg daily ideally mainly or fully from food sources +/- supplement if needed, vit D 3932-2820 IU daily with food that contains fat for better vitamin absorption. Recommended regular weight bearing exercise as well as strength training exercise 2-3+ times per week and balance exercises regularly.  Reviewed Mediterranean style eating plan, regular physical exercise, stay well hydrated, do regular brain/cognitive exercise/challenges (eg puzzles of any type etc), avoid alcohol, mindfulness/meditation, vit D, eat omega three rich foods daily +/- use omega three supplement, keep engaged socially, stress reduction measures etc to help promote healthy brain aging.  Reviewed kidney stones and prevention of same including excellent daily hydration, add lemon to water, limit/avoid oxalate rich foods, avoid calcium supplements (but ok to eat calcium rich foods).  Reviewed importance of good self care (e.g. meditation, yoga, adequate rest, regular exercise, magnesium, clean eating, etc.).  Follow up with specialists as recommended by them.  Follow up for CPE and RPA visit (thyroid, PTH, pain med f/u etc) in about 6 months and will rpt fasting labs then.  Time spent immediately before and after, as well as Face-to-face time spent during visit with patient, over half in discussion of the above diagnoses and treatment plan: 30 minutes.

## 2025-06-13 NOTE — PHYSICAL EXAM
[No Acute Distress] : no acute distress [Well Nourished] : well nourished [Well Developed] : well developed [Well-Appearing] : well-appearing [Normal Sclera/Conjunctiva] : normal sclera/conjunctiva [EOMI] : extraocular movements intact [Normal Outer Ear/Nose] : the outer ears and nose were normal in appearance [Normal Oropharynx] : the oropharynx was normal [No JVD] : no jugular venous distention [No Lymphadenopathy] : no lymphadenopathy [Supple] : supple [Thyroid Normal, No Nodules] : the thyroid was normal and there were no nodules present [No Respiratory Distress] : no respiratory distress  [No Accessory Muscle Use] : no accessory muscle use [Clear to Auscultation] : lungs were clear to auscultation bilaterally [Normal Rate] : normal rate  [Regular Rhythm] : with a regular rhythm [Normal S1, S2] : normal S1 and S2 [No Murmur] : no murmur heard [No Carotid Bruits] : no carotid bruits [No Varicosities] : no varicosities [Pedal Pulses Present] : the pedal pulses are present [No Edema] : there was no peripheral edema [No Extremity Clubbing/Cyanosis] : no extremity clubbing/cyanosis [Soft] : abdomen soft [Non Tender] : non-tender [Non-distended] : non-distended [No Masses] : no abdominal mass palpated [No HSM] : no HSM [Normal Bowel Sounds] : normal bowel sounds [Normal Posterior Cervical Nodes] : no posterior cervical lymphadenopathy [Normal Anterior Cervical Nodes] : no anterior cervical lymphadenopathy [No Joint Swelling] : no joint swelling [Grossly Normal Strength/Tone] : grossly normal strength/tone [No Rash] : no rash [Coordination Grossly Intact] : coordination grossly intact [No Focal Deficits] : no focal deficits [Normal Gait] : normal gait [Normal Affect] : the affect was normal [Normal Insight/Judgement] : insight and judgment were intact [de-identified] : +diffuse thyroid gland enlargement R>L, R sided nodule 3-4+ cm in size, stable exam from prior  [de-identified] : slender frame

## 2025-06-13 NOTE — HISTORY OF PRESENT ILLNESS
[FreeTextEntry1] : DILEEP CARMONA is a 68 year old female here for a follow up visit.  [de-identified] : Crystal has Hashimoto s thyroiditis and thyroid nodules, osteoporosis/osteopenia (On Ibandronate Rx since 2023 with rheum), chronic back/leg pain/spinal stenosis, OA, and h/o Hep C (considered cured).  Sees Dr. Goldberg ([facility]) for her osteoporosis. Has seen Dr. Mayo (ENT) periodically re thyroid nodule over prior years though needs to change ENT at this time as was advised by Dr. Mayo/team that he is no longer doing thyroid nodule/biopsy follow up.   She takes Tramadol 25 mg 1/3 of a pill occas for severe pain. Limits use as much as she can and Rx for #60 pills lasts 4-6 mos. Never takes more than 1/3 to 1/2 pill and med is well tolerated. Requesting RF today, last RF was in early 12/22024   Taking gabapentin 100 mg QHS though can take up to TID if needed. Main reason she is taking this is for her chronic pain and tinnitus and it does work. Is using Tramadol more days.  Consulted with a different ortho/spine HSS Dr. Calzada in 2023. Had MRI of C/T/L/S spine. She has multilevel OA and post surgical changes. He told her no more surgical options exist for her at this point and recommended medical mgmt.. She has not seen pain mgmt. and we are discussing this today. She will look into seeing a pain mgmt. 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 Had XR from 10/2022 showing migration of screw. Is managing to live with this for now but pain is daily and significant and worsening as she gets older. Saw specialist in [facility] a few yrs ago and was advised no surgical options. We are reviewing again today option to see pain mgmt for consultation and she feels is not needed at this time as pain is at a stable/manageable level with current regimen.

## 2025-07-26 NOTE — ASSESSMENT
[FreeTextEntry1] : Complete skin examination is negative for malignancy; Multiple new concerns were addressed and discussed. Therapeutic options and their risks and benefits; along with multiple diagnostic possibilities were discussed at length; risks and benefits of skin biopsy and/or other further study were discussed;  Continue regular exams; Follow up for [facility]SE in 1 year   Benign SKs; new on abdomen;  discussed cosmetic removal briefly

## 2025-07-26 NOTE — HISTORY OF PRESENT ILLNESS
[de-identified] : Pt. presents for skin check; c/o few spots of concern;  new spots on abdomen, trunk Severity:  mild   Modifying factors:  none Associated symptoms:  none Context:  no association with activity

## 2025-07-26 NOTE — PHYSICAL EXAM
[Full Body Skin Exam Performed] : performed [FreeTextEntry3] : Skin examination performed of the face, neck, trunk, arms, legs;  The patient is well, alert and oriented, pleasant and cooperative. Eyelids, conjunctivae, oral mucosa, digits and nails all normal.   No cervical adenopathy.  Normal findings include:  Seborrheic keratoses- multiple on trunk, largest lesions on back macular SKs;  one on left forearm, also R upper arm new SK suprapubic area, darker lesion R inframammary Angiomas Lentigines  No lesions were suspicious for malignancy.